# Patient Record
Sex: FEMALE
[De-identification: names, ages, dates, MRNs, and addresses within clinical notes are randomized per-mention and may not be internally consistent; named-entity substitution may affect disease eponyms.]

---

## 2019-06-08 ENCOUNTER — TRANSCRIPTION ENCOUNTER (OUTPATIENT)
Age: 59
End: 2019-06-08

## 2020-07-19 ENCOUNTER — RESULT REVIEW (OUTPATIENT)
Age: 60
End: 2020-07-19

## 2020-07-20 ENCOUNTER — APPOINTMENT (OUTPATIENT)
Dept: MAMMOGRAPHY | Facility: HOSPITAL | Age: 60
End: 2020-07-20

## 2020-07-20 ENCOUNTER — OUTPATIENT (OUTPATIENT)
Dept: OUTPATIENT SERVICES | Facility: HOSPITAL | Age: 60
LOS: 1 days | End: 2020-07-20
Payer: COMMERCIAL

## 2020-07-20 PROBLEM — Z00.00 ENCOUNTER FOR PREVENTIVE HEALTH EXAMINATION: Status: ACTIVE | Noted: 2020-07-20

## 2020-07-20 PROCEDURE — 19081 BX BREAST 1ST LESION STRTCTC: CPT | Mod: LT

## 2020-07-20 PROCEDURE — 88360 TUMOR IMMUNOHISTOCHEM/MANUAL: CPT

## 2020-07-20 PROCEDURE — 77065 DX MAMMO INCL CAD UNI: CPT | Mod: 26,LT

## 2020-07-20 PROCEDURE — 19081 BX BREAST 1ST LESION STRTCTC: CPT

## 2020-07-20 PROCEDURE — 88305 TISSUE EXAM BY PATHOLOGIST: CPT | Mod: 26

## 2020-07-20 PROCEDURE — A4648: CPT

## 2020-07-20 PROCEDURE — 88360 TUMOR IMMUNOHISTOCHEM/MANUAL: CPT | Mod: 26

## 2020-07-20 PROCEDURE — 88305 TISSUE EXAM BY PATHOLOGIST: CPT

## 2020-07-20 PROCEDURE — 77065 DX MAMMO INCL CAD UNI: CPT

## 2020-07-22 LAB
SURGICAL PATHOLOGY STUDY: SIGNIFICANT CHANGE UP

## 2020-07-29 PROBLEM — Z00.00 ENCOUNTER FOR PREVENTIVE HEALTH EXAMINATION: Status: ACTIVE | Noted: 2020-07-29

## 2020-07-30 ENCOUNTER — OUTPATIENT (OUTPATIENT)
Dept: OUTPATIENT SERVICES | Facility: HOSPITAL | Age: 60
LOS: 1 days | End: 2020-07-30
Payer: COMMERCIAL

## 2020-07-30 ENCOUNTER — APPOINTMENT (OUTPATIENT)
Dept: MRI IMAGING | Facility: HOSPITAL | Age: 60
End: 2020-07-30

## 2020-07-30 PROCEDURE — A9585: CPT

## 2020-07-30 PROCEDURE — 77049 MRI BREAST C-+ W/CAD BI: CPT | Mod: 26

## 2020-07-30 PROCEDURE — C8937: CPT

## 2020-07-30 PROCEDURE — C8908: CPT

## 2020-07-30 PROCEDURE — 77049 MRI BREAST C-+ W/CAD BI: CPT

## 2024-03-28 PROBLEM — Z78.9 NON-SMOKER: Status: ACTIVE | Noted: 2024-03-28

## 2024-03-28 PROBLEM — Z80.41 FAMILY HISTORY OF OVARIAN CANCER: Status: ACTIVE | Noted: 2024-03-28

## 2024-03-28 PROBLEM — M89.8X9 METABOLIC BONE DISEASE: Status: RESOLVED | Noted: 2024-03-28 | Resolved: 2024-03-28

## 2024-03-28 PROBLEM — Z80.8 FAMILY HISTORY OF MALIGNANT NEOPLASM OF SKIN: Status: ACTIVE | Noted: 2024-03-28

## 2024-03-28 PROBLEM — Z85.3 PERSONAL HISTORY OF BREAST CANCER: Status: ACTIVE | Noted: 2024-03-28

## 2024-03-28 PROBLEM — Z86.000 HISTORY OF DUCTAL CARCINOMA IN SITU (DCIS) OF LEFT BREAST: Status: RESOLVED | Noted: 2024-03-28 | Resolved: 2024-03-28

## 2024-03-28 PROBLEM — Z92.3 HISTORY OF RADIATION THERAPY: Status: RESOLVED | Noted: 2024-03-28 | Resolved: 2024-03-28

## 2024-03-28 PROBLEM — Z86.010 HISTORY OF COLONIC POLYPS: Status: RESOLVED | Noted: 2024-03-28 | Resolved: 2024-03-28

## 2024-03-28 PROBLEM — Z86.39 HISTORY OF HYPOTHYROIDISM: Status: RESOLVED | Noted: 2024-03-28 | Resolved: 2024-03-28

## 2024-03-28 RX ORDER — RALOXIFENE HYDROCHLORIDE 60 MG/1
60 TABLET, FILM COATED ORAL
Refills: 0 | Status: ACTIVE | COMMUNITY

## 2024-03-28 RX ORDER — PRAVASTATIN SODIUM 20 MG/1
20 TABLET ORAL
Refills: 0 | Status: ACTIVE | COMMUNITY

## 2024-03-28 RX ORDER — LEVOTHYROXINE SODIUM 50 UG/1
50 TABLET ORAL
Refills: 0 | Status: ACTIVE | COMMUNITY

## 2024-03-31 ENCOUNTER — NON-APPOINTMENT (OUTPATIENT)
Age: 64
End: 2024-03-31

## 2024-03-31 DIAGNOSIS — Z86.000 PERSONAL HISTORY OF IN-SITU NEOPLASM OF BREAST: ICD-10-CM

## 2024-03-31 DIAGNOSIS — Z85.3 PERSONAL HISTORY OF MALIGNANT NEOPLASM OF BREAST: ICD-10-CM

## 2024-03-31 DIAGNOSIS — Z86.39 PERSONAL HISTORY OF OTHER ENDOCRINE, NUTRITIONAL AND METABOLIC DISEASE: ICD-10-CM

## 2024-03-31 DIAGNOSIS — Z80.8 FAMILY HISTORY OF MALIGNANT NEOPLASM OF OTHER ORGANS OR SYSTEMS: ICD-10-CM

## 2024-03-31 DIAGNOSIS — Z92.3 PERSONAL HISTORY OF IRRADIATION: ICD-10-CM

## 2024-03-31 DIAGNOSIS — Z80.41 FAMILY HISTORY OF MALIGNANT NEOPLASM OF OVARY: ICD-10-CM

## 2024-03-31 DIAGNOSIS — Z86.010 PERSONAL HISTORY OF COLONIC POLYPS: ICD-10-CM

## 2024-03-31 DIAGNOSIS — Z78.9 OTHER SPECIFIED HEALTH STATUS: ICD-10-CM

## 2024-03-31 DIAGNOSIS — M89.8X9 OTHER SPECIFIED DISORDERS OF BONE, UNSPECIFIED SITE: ICD-10-CM

## 2024-07-17 NOTE — ASSESSMENT
[FreeTextEntry1] : DCIS S/P WLE Lt breast.  Pt will be referred to Dr. Bradford for radiation therapy - may be a candidate for partial breast.  Antiestrogen treatment may be given for chemoprevention.  Pt had DCIS as well as classic LCIS.  Risks for future cancers were reviewed, HRT is contraindicated.  Consideration will be given to [facility] considering pt's osteoporosis, also discussed Tamoxifen, would not give aromatase inhibitor.  She will have f/u bone density.  Genetic testing was discussed in detail.  Enhanced surveillance was reviewed with the pt, f/u mammo in 6 months Lt side, then bilat annuallyShe will continue under the care of all of her physicians.  Metabolic bone evaluation could be considered.  Pt is followed by Dr. Ken Galindo.

## 2024-07-17 NOTE — HISTORY OF PRESENT ILLNESS
[de-identified] : 60-yo female- bilat mammo and US done 20 revealed new calcifications in the Lt breast. Core bx 2020, DCIS cribriform type with intermediate grade nuclei and focal necrosis, ER and NE both 100%.  Bilat MRI had additional enhancement in the Lt breast.  MRI-guided bx 20 LCIS classic type with intermediate grade nuclei.  20, Lt WLE, DCIS at least 14 mm,intermediate nuclei, solid cribriform type, no necrosis, LCIS classic type, final margins neg, ER and NE both > 95%.  P/O course has been uneventful.  CURRENT MEDS: Levoxyl 50 mcg q day.  ALLERGIES: Penicillin.  PMH: Menarche 13, LMP 55, G5, P3, Ab2,  31, 34, 39, breast-fed 7 months and at least 1Y others.  HRT  to 2020. GYN exam 20. Hx hypothyroid, hepatitis A, has had  flu vax, fertility 2 cycles ? Clomid, appendectomy, tonsillectomy, colonoscopy 2Y ago - Hx  polyps, BMD , hx of osteoporosis..  FAMILY HX: One-half Ashkenazic (paternal), maternal grandmother had ovarian cancer  76, mother had nonmelanoma skin cancer  95, had 1 brother  of Parkinson's and another brother, father had melanoma at 70,  82 of other causes had 1 sister and 1 brother.  Pt has 2 sisters, 1 brother, 2 daughters and 1 son.    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

## 2024-08-01 RX ORDER — RALOXIFENE HYDROCHLORIDE 60 MG/1
60 TABLET, FILM COATED ORAL DAILY
Qty: 90 | Refills: 2 | Status: ACTIVE | COMMUNITY
Start: 2024-08-01 | End: 1900-01-01

## 2024-08-13 ENCOUNTER — APPOINTMENT (OUTPATIENT)
Dept: HEMATOLOGY ONCOLOGY | Facility: CLINIC | Age: 64
End: 2024-08-13

## 2024-09-24 ENCOUNTER — APPOINTMENT (OUTPATIENT)
Dept: HEMATOLOGY ONCOLOGY | Facility: CLINIC | Age: 64
End: 2024-09-24
Payer: COMMERCIAL

## 2024-09-24 DIAGNOSIS — M81.0 AGE-RELATED OSTEOPOROSIS W/OUT CURRENT PATHOLOGICAL FRACTURE: ICD-10-CM

## 2024-09-24 DIAGNOSIS — Z79.810 LONG TERM (CURRENT) USE OF SELECTIVE ESTROGEN RECEPTOR MODULATORS (SERMS): ICD-10-CM

## 2024-09-24 DIAGNOSIS — Z86.39 PERSONAL HISTORY OF OTHER ENDOCRINE, NUTRITIONAL AND METABOLIC DISEASE: ICD-10-CM

## 2024-09-24 DIAGNOSIS — D05.12 INTRADUCTAL CARCINOMA IN SITU OF LEFT BREAST: ICD-10-CM

## 2024-09-24 PROCEDURE — 99214 OFFICE O/P EST MOD 30 MIN: CPT

## 2024-09-24 PROCEDURE — G2211 COMPLEX E/M VISIT ADD ON: CPT | Mod: NC

## 2024-09-24 PROCEDURE — 99204 OFFICE O/P NEW MOD 45 MIN: CPT

## 2024-09-24 RX ORDER — CHROMIUM 200 MCG
25 MCG TABLET ORAL
Refills: 0 | Status: ACTIVE | COMMUNITY
Start: 2024-09-24

## 2024-09-24 NOTE — ASSESSMENT
[FreeTextEntry1] : Color panel neg,DCIS S/P WLE Lt breast and RT. On Raloxifene for chemoprevention 12/20 to cont.  Pt had DCIS as well as classic LCIS.Hx osteoporosis. Bilat MMG/US 10/24, routine gyn. Reviewed diet and exercise She will continue under the care of all of her physicians. Pt is followed by Dr. Villegas for osteoporosis. F/U 6 months

## 2024-09-24 NOTE — HISTORY OF PRESENT ILLNESS
[de-identified] : 64-yo female- bilat mammo and US done 20 revealed new calcifications in the Lt breast. Core bx 2020, DCIS cribriform type with intermediate grade nuclei and focal necrosis, ER and AZ both 100%. Bilat MRI had additional enhancement in the Lt breast. MRI-guided bx 20 LCIS classic type with intermediate grade nuclei. 20, Lt WLE, DCIS at least 14 mm,intermediate nuclei, solid cribriform type, no necrosis, LCIS classic type, final margins neg, ER and AZ both > 95%.S/p RT. 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 Annual MMG/US 1023 - [facility]. BMD  - followed by Dr Leonardo meyer- could not compare - f/u 1 yr   ALLERGIES: Penicillin.  PMH: Menarche 13, LMP 55, G5, P3, Ab2,  31, 34, 39, breast-fed 7 months and at least 1Y others. HRT  to 2020. GYN exam . Hx hypothyroid, hepatitis A, has had  flu vax, fertility 2 cycles ? Clomid, appendectomy, tonsillectomy, colonoscopy 1 wk ago  no polyps - Hx polyps, BMD , hx of osteoporosis  Soc Hx: , [email or web address], no tobacco or ETOH, exercises..   FAMILY HX: Color panel neg 1/2 Ashkenazic (paternal), maternal grandmother had ovarian cancer 76, mother had nonmelanoma skin cancer  , had 1 brother  of Parkinson's and another brother, father had melanoma at 70,  82 of other causes had 1 sister and 1 brother. Pt has 2 sisters, 1 brother, 2 daughters and 1 son.        FAMILY HX: One-half Ashkenazic (paternal), maternal grandmother had ovarian cancer 76, mother had nonmelanoma skin cancer  95, had 1 brother  of Parkinson's and another brother, father had melanoma at 70,  82 of other causes had 1 sister and 1 brother. Pt has 2 sisters, 1 brother, 2 daughters and 1 son.    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

## 2024-09-24 NOTE — PHYSICAL EXAM
[Restricted in physically strenuous activity but ambulatory and able to carry out work of a light or sedentary nature] : Status 1- Restricted in physically strenuous activity but ambulatory and able to carry out work of a light or sedentary nature, e.g., light house work, office work [Normal] : affect appropriate [de-identified] : Rt unremarkable, S/P LT WLE RT, no palp

## 2024-09-24 NOTE — PHYSICAL EXAM
[Restricted in physically strenuous activity but ambulatory and able to carry out work of a light or sedentary nature] : Status 1- Restricted in physically strenuous activity but ambulatory and able to carry out work of a light or sedentary nature, e.g., light house work, office work [Normal] : affect appropriate [de-identified] : Rt unremarkable, S/P LT WLE RT, no palp

## 2024-09-24 NOTE — HISTORY OF PRESENT ILLNESS
[de-identified] : 64-yo female- bilat mammo and US done 20 revealed new calcifications in the Lt breast. Core bx 2020, DCIS cribriform type with intermediate grade nuclei and focal necrosis, ER and PA both 100%. Bilat MRI had additional enhancement in the Lt breast. MRI-guided bx 20 LCIS classic type with intermediate grade nuclei. 20, Lt WLE, DCIS at least 14 mm,intermediate nuclei, solid cribriform type, no necrosis, LCIS classic type, final margins neg, ER and PA both > 95%.S/p RT. 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 Annual MMG/US 1023 - [facility]. BMD  - followed by Dr Leonardo meyer- could not compare - f/u 1 yr   ALLERGIES: Penicillin.  PMH: Menarche 13, LMP 55, G5, P3, Ab2,  31, 34, 39, breast-fed 7 months and at least 1Y others. HRT  to 2020. GYN exam . Hx hypothyroid, hepatitis A, has had  flu vax, fertility 2 cycles ? Clomid, appendectomy, tonsillectomy, colonoscopy 1 wk ago  no polyps - Hx polyps, BMD , hx of osteoporosis  Soc Hx: , sold home in [place], no tobacco or ETOH, exercises..   FAMILY HX: Color panel neg 1/2 Ashkenazic (paternal), maternal grandmother had ovarian cancer 76, mother had nonmelanoma skin cancer  , had 1 brother  of Parkinson's and another brother, father had melanoma at 70,  82 of other causes had 1 sister and 1 brother. Pt has 2 sisters, 1 brother, 2 daughters and 1 son.        FAMILY HX: One-half Ashkenazic (paternal), maternal grandmother had ovarian cancer 76, mother had nonmelanoma skin cancer  95, had 1 brother  of Parkinson's and another brother, father had melanoma at 70,  82 of other causes had 1 sister and 1 brother. Pt has 2 sisters, 1 brother, 2 daughters and 1 son.    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

## 2025-03-25 ENCOUNTER — NON-APPOINTMENT (OUTPATIENT)
Age: 65
End: 2025-03-25

## 2025-03-25 ENCOUNTER — APPOINTMENT (OUTPATIENT)
Dept: HEMATOLOGY ONCOLOGY | Facility: CLINIC | Age: 65
End: 2025-03-25
Payer: COMMERCIAL

## 2025-03-25 VITALS
DIASTOLIC BLOOD PRESSURE: 82 MMHG | RESPIRATION RATE: 18 BRPM | HEIGHT: 62 IN | SYSTOLIC BLOOD PRESSURE: 138 MMHG | OXYGEN SATURATION: 97 % | WEIGHT: 164 LBS | HEART RATE: 80 BPM | TEMPERATURE: 97.8 F | BODY MASS INDEX: 30.18 KG/M2

## 2025-03-25 DIAGNOSIS — M81.0 AGE-RELATED OSTEOPOROSIS W/OUT CURRENT PATHOLOGICAL FRACTURE: ICD-10-CM

## 2025-03-25 DIAGNOSIS — L30.9 DERMATITIS, UNSPECIFIED: ICD-10-CM

## 2025-03-25 DIAGNOSIS — Z79.810 LONG TERM (CURRENT) USE OF SELECTIVE ESTROGEN RECEPTOR MODULATORS (SERMS): ICD-10-CM

## 2025-03-25 DIAGNOSIS — D05.12 INTRADUCTAL CARCINOMA IN SITU OF LEFT BREAST: ICD-10-CM

## 2025-03-25 PROCEDURE — G2211 COMPLEX E/M VISIT ADD ON: CPT | Mod: NC

## 2025-03-25 PROCEDURE — 99214 OFFICE O/P EST MOD 30 MIN: CPT

## 2025-03-25 RX ORDER — RALOXIFENE HYDROCHLORIDE 60 MG/1
60 TABLET, FILM COATED ORAL DAILY
Qty: 90 | Refills: 2 | Status: ACTIVE | COMMUNITY
Start: 2025-03-25 | End: 1900-01-01

## 2025-03-25 RX ORDER — EVOLOCUMAB 140 MG/ML
140 INJECTION, SOLUTION SUBCUTANEOUS
Refills: 0 | Status: ACTIVE | COMMUNITY
Start: 2025-03-25